# Patient Record
Sex: FEMALE | Race: WHITE
[De-identification: names, ages, dates, MRNs, and addresses within clinical notes are randomized per-mention and may not be internally consistent; named-entity substitution may affect disease eponyms.]

---

## 2017-08-18 ENCOUNTER — HOSPITAL ENCOUNTER (OUTPATIENT)
Dept: HOSPITAL 39 - LAB.O | Age: 47
Discharge: HOME | End: 2017-08-18
Attending: FAMILY MEDICINE
Payer: COMMERCIAL

## 2017-08-18 DIAGNOSIS — R53.82: ICD-10-CM

## 2017-08-18 DIAGNOSIS — E05.90: ICD-10-CM

## 2017-08-18 DIAGNOSIS — M79.1: Primary | ICD-10-CM

## 2017-08-18 DIAGNOSIS — M25.50: ICD-10-CM

## 2017-08-18 DIAGNOSIS — R30.0: ICD-10-CM

## 2017-08-31 ENCOUNTER — HOSPITAL ENCOUNTER (OUTPATIENT)
Dept: HOSPITAL 39 - MRI | Age: 47
Discharge: HOME | End: 2017-08-31
Attending: FAMILY MEDICINE
Payer: COMMERCIAL

## 2017-08-31 DIAGNOSIS — N95.1: ICD-10-CM

## 2017-08-31 DIAGNOSIS — E23.7: ICD-10-CM

## 2017-08-31 DIAGNOSIS — M54.16: Primary | ICD-10-CM

## 2017-08-31 NOTE — MRI
EXAM DESCRIPTION: 



Lumbar Spine w/o Contrast



CLINICAL HISTORY: 



RADICULOPATHY right-sided back pain radiating into the right

lower extremity



COMPARISON: 



None Available.



TECHNIQUE: 



MRI of the lumbar spine is performed according to our usual

protocol with axial and sagittal multi sequence imaging.



FINDINGS: 



 Normal alignment of the vertebral column with preservation of

vertebral and disc height without abnormal marrow signal

intensity is noted. The retroperitoneal and paraspinous

structures are normal. The conus is positioned at the L1 level

without intradural or intramedullary abnormalities. Tiniest

amount of curvature of the spine convex to the right is present

on coronal imaging.



L1-2: the disc is well hydrated. There is no loss of height.

There is no bulging. The facets are unremarkable with no

significant hypertrophy. There is no stenosis or impingement.

L2-3: the disc is well hydrated. There is no loss of height.

There is no bulging. The facets are unremarkable with no

significant hypertrophy. There is no stenosis or impingement.

L3-4: the disc is well hydrated. There is no loss of height.

There is no bulging. The facets are unremarkable with no

significant hypertrophy. There is no stenosis or impingement.

L4-5: the disc is well hydrated. There is no loss of height.

There is no bulging. The facets are unremarkable with no

significant hypertrophy. There is no stenosis or impingement.



L5-S1: the disc is well hydrated. There is no loss of height.

There is no bulging. The facets are unremarkable with no

significant hypertrophy. There is no stenosis or impingement.





IMPRESSION: 



Normal MRI of the lumbar spine without significant disc bulge or

herniation or stenosis.



Electronically signed by:  Milton Elizalde MD  8/31/2017 9:31 AM

[facility] Workstation: [id number]

## 2018-11-30 ENCOUNTER — HOSPITAL ENCOUNTER (OUTPATIENT)
Dept: HOSPITAL 39 - MRI | Age: 48
End: 2018-11-30
Attending: NURSE PRACTITIONER
Payer: COMMERCIAL

## 2018-11-30 DIAGNOSIS — S83.281A: Primary | ICD-10-CM

## 2018-12-03 NOTE — MRI
EXAM DESCRIPTION: 



MRI left knee



CLINICAL HISTORY: 



Left knee pain and swelling



COMPARISON: 



None.



TECHNIQUE: 



Multiplanar, multisequence MR images of the left knee



FINDINGS: 



Tear of the anterior horn lateral meniscus with horizontal tear

from the periphery to the superior articular surface from the mid

body through the anterior horn extending down to the tibial root

attachment. Associated parameniscal cyst in the anterior joint

measuring 2.6 cm transverse by 1.4 x 1.2 cm.



No lateral femorotibial chondrosis. Small joint line osteophyte

anteriorly



Medial meniscus is normal. No high-grade chondrosis or chronic

osteochondral lesion medial femorotibial



Patellar cartilage intact. Chondrosis with grade 4 chronic

osteochondral lesion of the lower medial trochlea, approximately

1.4 x 1 cm. Minimal subchondral cortical irregularity and edema



ACL, PCL, MCL and fibular collateral ligaments are intact



Biceps femoris, popliteus and iliotibial band tendons are normal.

Proximal medial patellar tendinosis with surrounding mild soft

tissue edema



Small joint effusion. Thin medial patellar plica. No

intra-articular loose body



IMPRESSION: 



Tear of the anterior horn lateral meniscus with associated

parameniscal cyst anteriorly along the infrapatellar fat



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



Electronically signed by:  Milton Rowe MD  12/3/2018 7:53 AM

[facility] Workstation: [id number]